# Patient Record
Sex: MALE | Race: BLACK OR AFRICAN AMERICAN | NOT HISPANIC OR LATINO | URBAN - METROPOLITAN AREA
[De-identification: names, ages, dates, MRNs, and addresses within clinical notes are randomized per-mention and may not be internally consistent; named-entity substitution may affect disease eponyms.]

---

## 2023-06-08 ENCOUNTER — WEB ENCOUNTER (OUTPATIENT)
Dept: URBAN - METROPOLITAN AREA CLINIC 92 | Facility: CLINIC | Age: 63
End: 2023-06-08

## 2023-06-08 ENCOUNTER — DASHBOARD ENCOUNTERS (OUTPATIENT)
Age: 63
End: 2023-06-08

## 2023-06-08 ENCOUNTER — OFFICE VISIT (OUTPATIENT)
Dept: URBAN - METROPOLITAN AREA CLINIC 92 | Facility: CLINIC | Age: 63
End: 2023-06-08
Payer: MEDICARE

## 2023-06-08 VITALS
TEMPERATURE: 97.2 F | BODY MASS INDEX: 28.36 KG/M2 | DIASTOLIC BLOOD PRESSURE: 67 MMHG | SYSTOLIC BLOOD PRESSURE: 110 MMHG | HEART RATE: 56 BPM | HEIGHT: 74 IN | WEIGHT: 221 LBS

## 2023-06-08 DIAGNOSIS — Z12.11 COLON CANCER SCREENING: ICD-10-CM

## 2023-06-08 PROCEDURE — 99202 OFFICE O/P NEW SF 15 MIN: CPT

## 2023-06-08 NOTE — PHYSICAL EXAM HENT:
Head,  normocephalic,  atraumatic,  Face,  Face within normal limits You have a bacterial infection in the membranes covering the eye.  The most common symptoms include a thick discharge form the eye, swollen eyelids, redness, eyelids sticking together upon awakening, and a gritty or scratchy feeling in the eye.  The infection takes about 7-10 days to resolve with treatment.      use prescribed eye drops or ointment as directed to treat the infection  *apply a warm compress (cotton ball soaked in warm water) to the affected eye  for 10-15 minutes every 3-4 hours as needed for 5 days.   *Use a warm, wet cotton ball to wipe away crusting of the eyelids in the morning. You can use a mild baby soap with warm water to clean the crust in your eye. Avoid rubbing or touching the eye.  Cleanse secretions, wiping from inner corner of eye to outer aspect of eye. Do not reuse tissues after wiping  *If you use make up, discard all eye makeup and products.  *Wash pillow cases,linens and towels. Do not share items.  *Wash your hands before and after touching the infected eye. Wash hands frequently with an antibacterial soap.  *If you use contact lenses, discard contact lenses and case. Do not wear contact lenses until your eyes have healed and all symptoms are gone.  Bacterial conjunctivitis is contagious for 24-48 hours after starting medication  You are contagious for the next 24 hours   You may return to work / school 24 hours after antibiotic drops are started.  Avoid rubbing eyes to prevent spread of infection.  Wash hand frequently and before and after applying eye drops.  Do not share eye medications.  Do not share eye makeup  Throw away previously used contacts, case, and solution.  Avoid wearing contacts for the duration of antibiotic treatment.  If infection spreads to other eye, may use antibiotic drops with same instructions for the full 7 days.  Wipe away drainage with fresh tissue every time and immediate discard used tissues.  May apply warm, moist compresses over eye to soften  crusted eye drainage   Finish the antibiotic prescription to avoid resistant bacteria.    Medical compliance with plan discussed and risks of noncompliance reviewed    Patient verbalizes understanding of treatment plan  Thank you for visiting [facility].  Please follow up with your primary doctor as needed       Call the office if symptoms are not improving or worsening.  Follow up with ophthalmologist if symptoms are worsening or not improving in 24 hours.     Get Prompt Medical Attention if any of the following occur:  -worsening vision  -increasing pain in the eye  -increasing swelling or redness of the eyelid   -sensitivity to light  -Redness spreading around the eye      Written Hand out given.  Pt expressed understanding of the plan.  Medical compliance with Plan Discussed and risks of non-compliance Reviewed.  If symptoms continue or worsen after 24 hrs, seek immediate care.  Proper use of medication and side effects of medications reviewed and discussed.  Take medication as directed.  Patient education completed on disease process, etiology and prognosis.  Return to the clinic as clinically indicated as discussed with patient who verbalizes understanding of agreement and agreement with the pan.  Call the clinic with any questions or concerns [phone].       OTITIS MEDIA:  May use Ibuprofen 400-600 mg every 6-8 hrs as needed for pain.  Finish all of antibiotics as prescribed to prevent resistance.  Should notice improvement within 24 hours.  Follow up for any questions, concerns, or worsening of symptoms.   Medical compliance with plan discussed and risks of noncompliance reviewed    Patient verbalizes understanding of treatment plan    Thank you for visiting [facility].  Please follow up with your primary doctor as needed         Patient Education     What Is Conjunctivitis?    Conjunctivitis is an irritation or infection. It affects the membrane that covers the white of your  eye and the inside of your eyelid (conjunctiva). It can happen to one or both eyes. The membrane swells and the blood vessels enlarge (dilate). This makes your eye red. That's why conjunctivitis is sometimes called red eye or pink eye.  What are the symptoms?  If you have one or more of these symptoms, see an eye healthcare provider:  · Redness in and around your eye  · Eyes that are puffy and sore  · Itching, burning, or stinging eyes  · Watery eyes or discharge from your eye  · Eyelids that are crusty or stuck together when you wake up in the morning  · Pink color in the whites of one or both eyes  · Sensitivity to bright light  Getting treatment quickly can help prevent damage to your eyes.  How is it diagnosed?  Conjunctivitis is usually a minor eye infection. But it can sometimes become a more serious problem. Some more serious eye diseases have symptoms that look like conjunctivitis. So it's important for an eye healthcare provider to diagnose you. Your eye healthcare provider will ask about your symptoms and any medicines you take. He or she will ask about any illnesses or medical conditions you may have. The healthcare provider will also check your eyes with a hand-held light and a special microscope called a slit lamp.  Date Last Reviewed: 10/1/2017  © 4815-9181 [employer] [address] [place] [address], [place] [address]. All rights reserved. This information is not intended as a substitute for professional medical care. Always follow your healthcare professional's instructions.           Patient Education     Middle Ear Infection (Adult)  You have an infection of the middle ear, the space behind the eardrum. This is also called acute otitis media (AOM). Sometimes it is caused by the common cold. This is because congestion can block the internal passage (eustachian tube) that drains fluid from the middle ear. When the middle ear fills with fluid, bacteria can grow there and cause an infection. Oral  antibiotics are used to treat this illness, not ear drops. Symptoms usually start to improve within 1 to 2 days of treatment.    Home care  The following are general care guidelines:  · Finish all of the antibiotic medicine given, even though you may feel better after the first few days.  · You may use over-the-counter medicine, such as acetaminophen or ibuprofen, to control pain and fever, unless something else was prescribed. If you have chronic liver or kidney disease or have ever had a stomach ulcer or gastrointestinal bleeding, talk with your healthcare provider before using these medicines. Do not give aspirin to anyone under 18 years of age who has a fever. It may cause severe illness or death.  Follow-up care  Follow up with your healthcare provider, or as advised, in 2 weeks if all symptoms have not gotten better, or if hearing doesn't go back to normal within 1 month.  When to seek medical advice  Call your healthcare provider right away if any of these occur:  · Ear pain gets worse or does not improve after 3 days of treatment  · Unusual drowsiness or confusion  · Neck pain, stiff neck, or headache  · Fluid or blood draining from the ear canal  · Fever of 100.4°F (38°C) or as advised   · Seizure  Date Last Reviewed: 6/1/2016  © 2986-6131 [employer]. [address] [place] [address], [place] [address]. All rights reserved. This information is not intended as a substitute for professional medical care. Always follow your healthcare professional's instructions.

## 2023-06-08 NOTE — HPI-TODAY'S VISIT:
63 year-old male who presents for a colon cancer screening. Was referred by Dr Gladys johnson a copy of this note will be sent to referring provider.   Last colonoscopy: never No family history of colon polyps or colon cancer.  Has daily bm or every other day. Patient denies nausea, heartburn, dysphagia, abdominal pain, rectal bleeding, melena, unintentional weight loss, changes in bowel habits and loss of appetite. Patinet denies blood thinner use, pacemaker/defibrillator, diabetes, kidney disease, and home O2.

## 2023-06-09 PROBLEM — 305058001: Status: ACTIVE | Noted: 2023-06-09

## 2023-07-07 ENCOUNTER — OFFICE VISIT (OUTPATIENT)
Dept: URBAN - METROPOLITAN AREA SURGERY CENTER 16 | Facility: SURGERY CENTER | Age: 63
End: 2023-07-07

## 2023-08-25 ENCOUNTER — OFFICE VISIT (OUTPATIENT)
Dept: URBAN - METROPOLITAN AREA SURGERY CENTER 16 | Facility: SURGERY CENTER | Age: 63
End: 2023-08-25

## 2023-09-01 ENCOUNTER — OFFICE VISIT (OUTPATIENT)
Dept: URBAN - METROPOLITAN AREA SURGERY CENTER 16 | Facility: SURGERY CENTER | Age: 63
End: 2023-09-01